# Patient Record
Sex: FEMALE | Race: WHITE | NOT HISPANIC OR LATINO
[De-identification: names, ages, dates, MRNs, and addresses within clinical notes are randomized per-mention and may not be internally consistent; named-entity substitution may affect disease eponyms.]

---

## 2017-11-20 PROBLEM — Z00.00 ENCOUNTER FOR PREVENTIVE HEALTH EXAMINATION: Status: ACTIVE | Noted: 2017-11-20

## 2018-01-29 ENCOUNTER — APPOINTMENT (OUTPATIENT)
Dept: CARDIOLOGY | Facility: CLINIC | Age: 58
End: 2018-01-29
Payer: COMMERCIAL

## 2018-01-29 PROCEDURE — 93015 CV STRESS TEST SUPVJ I&R: CPT

## 2018-01-29 PROCEDURE — 93306 TTE W/DOPPLER COMPLETE: CPT

## 2018-02-19 ENCOUNTER — RX RENEWAL (OUTPATIENT)
Age: 58
End: 2018-02-19

## 2018-04-19 ENCOUNTER — RX RENEWAL (OUTPATIENT)
Age: 58
End: 2018-04-19

## 2018-04-23 ENCOUNTER — RX RENEWAL (OUTPATIENT)
Age: 58
End: 2018-04-23

## 2019-03-13 ENCOUNTER — RX RENEWAL (OUTPATIENT)
Age: 59
End: 2019-03-13

## 2019-03-14 ENCOUNTER — MEDICATION RENEWAL (OUTPATIENT)
Age: 59
End: 2019-03-14

## 2019-04-17 ENCOUNTER — RX RENEWAL (OUTPATIENT)
Age: 59
End: 2019-04-17

## 2019-04-19 ENCOUNTER — APPOINTMENT (OUTPATIENT)
Dept: CARDIOLOGY | Facility: CLINIC | Age: 59
End: 2019-04-19

## 2019-05-24 DIAGNOSIS — Z87.09 PERSONAL HISTORY OF OTHER DISEASES OF THE RESPIRATORY SYSTEM: ICD-10-CM

## 2019-05-24 RX ORDER — UBIDECARENONE 50 MG
CAPSULE ORAL
Refills: 0 | Status: ACTIVE | COMMUNITY

## 2019-05-28 ENCOUNTER — NON-APPOINTMENT (OUTPATIENT)
Age: 59
End: 2019-05-28

## 2019-05-28 ENCOUNTER — APPOINTMENT (OUTPATIENT)
Dept: CARDIOLOGY | Facility: CLINIC | Age: 59
End: 2019-05-28
Payer: COMMERCIAL

## 2019-05-28 VITALS
HEIGHT: 63 IN | HEART RATE: 75 BPM | RESPIRATION RATE: 16 BRPM | SYSTOLIC BLOOD PRESSURE: 142 MMHG | BODY MASS INDEX: 21.09 KG/M2 | WEIGHT: 119 LBS | DIASTOLIC BLOOD PRESSURE: 76 MMHG

## 2019-05-28 PROCEDURE — 99213 OFFICE O/P EST LOW 20 MIN: CPT

## 2019-05-28 PROCEDURE — 93000 ELECTROCARDIOGRAM COMPLETE: CPT

## 2019-05-28 NOTE — ASSESSMENT
[FreeTextEntry1] : EKG 5/28/2019:  The EKG illustrates normal sinus rhythm, rate of 75, no significant ST-T wave changes.\par \par Most recent blood work (4/16/2019):  HDL (67), Triglycerides (153), Glucose (102).\par \par Most recent Transthoracic Echocardiogram (1/29/2018):  Normal global left ventricular function with an EF of 65%, mild to moderate TR and MR, trace AR, thinning hypermobile intra-atrial septum.\par \par Most recent Exercise Stress Test (1/29/2018):  Patient exercised 10 METS, patient developed 3 beat SVT during exercise, EKG was negative for ischemia, Lazcano Treadmill score was 9 consistent with low risk.

## 2019-05-28 NOTE — REASON FOR VISIT
[FreeTextEntry1] : The patient presents back to the office today for cardiac reevaluation with known history of palpitations in the chest, hypertension, and anxiety.  She reports feeling overall well and without cardiac complaints.  The patient denies CP, SOB, RODGERS, PND, orthopnea, presyncope, syncope.

## 2019-05-28 NOTE — HISTORY OF PRESENT ILLNESS
[FreeTextEntry1] : She continues to report intermittent palpitations of which are consistent with her baseline, there are no associated symptoms.\par \par Patient is tolerating cardiac medications without negative side effects including Metoprolol Succinate 50 mg QD and Omega 3 fish oil.

## 2019-05-28 NOTE — DISCUSSION/SUMMARY
[FreeTextEntry1] : 1).  Patient is tolerating cardiac medications without negative side effects, continue with current cardiac medication regimen (refer above). \par \par 2).  Diet and lifestyle modification discussed including low fat and low carbohydrate weight reducing diet, implement aerobic exercise 4 to 5 days per week.  \par \par 3).  Follow up with PCP regarding routine checkups and blood work, have copy faxed to our office. \par \par 4).  No additional cardiac testing indicated at this time. \par \par 5).  Recommend patient report any untoward symptoms. \par \par 6).  Follow up with our office in 6 months or PRN.

## 2019-05-28 NOTE — PHYSICAL EXAM
[Normal Appearance] : normal appearance [General Appearance - Well Developed] : well developed [General Appearance - Well Nourished] : well nourished [General Appearance - In No Acute Distress] : no acute distress [Normal Conjunctiva] : the conjunctiva exhibited no abnormalities [Normal Oral Mucosa] : normal oral mucosa [Normal Jugular Venous A Waves Present] : normal jugular venous A waves present [Normal Jugular Venous V Waves Present] : normal jugular venous V waves present [No Jugular Venous Carranza A Waves] : no jugular venous carranza A waves [] : no respiratory distress [Respiration, Rhythm And Depth] : normal respiratory rhythm and effort [Auscultation Breath Sounds / Voice Sounds] : lungs were clear to auscultation bilaterally [Heart Rate And Rhythm] : heart rate and rhythm were normal [Heart Sounds] : normal S1 and S2 [Murmurs] : no murmurs present [Edema] : no peripheral edema present [Bowel Sounds] : normal bowel sounds [Abnormal Walk] : normal gait [Nail Clubbing] : no clubbing of the fingernails [Cyanosis, Localized] : no localized cyanosis [Skin Color & Pigmentation] : normal skin color and pigmentation [Skin Turgor] : normal skin turgor [Oriented To Time, Place, And Person] : oriented to person, place, and time [Impaired Insight] : insight and judgment were intact [Affect] : the affect was normal

## 2019-11-07 ENCOUNTER — NON-APPOINTMENT (OUTPATIENT)
Age: 59
End: 2019-11-07

## 2019-11-07 ENCOUNTER — APPOINTMENT (OUTPATIENT)
Dept: CARDIOLOGY | Facility: CLINIC | Age: 59
End: 2019-11-07
Payer: COMMERCIAL

## 2019-11-07 VITALS
BODY MASS INDEX: 20.55 KG/M2 | RESPIRATION RATE: 16 BRPM | HEIGHT: 63 IN | SYSTOLIC BLOOD PRESSURE: 133 MMHG | DIASTOLIC BLOOD PRESSURE: 82 MMHG | HEART RATE: 86 BPM | WEIGHT: 116 LBS

## 2019-11-07 PROCEDURE — 99214 OFFICE O/P EST MOD 30 MIN: CPT

## 2019-11-07 PROCEDURE — 93000 ELECTROCARDIOGRAM COMPLETE: CPT

## 2019-11-07 NOTE — REASON FOR VISIT
[Follow-Up - Clinic] : a clinic follow-up of [FreeTextEntry1] : The patient is a 59-year-old white female with a known history of mild to moderate valvular insufficiency and heart murmur with occasional palpitations of the chest who presents back to the office for general cardiac checkup today;\par \par Overall, patient states she's been doing well without any significant cardiac symptoms\par \par ;

## 2019-11-07 NOTE — ASSESSMENT
[FreeTextEntry1] : EKG demonstrates normal sinus rhythm with a moderate rate of 86 and appears within normal limits\par \par In summary the patient is a 5-year-old woman who has had a history of palpitations and mild valvular insufficiency in the past with otherwise stable cardiac pattern at this time\par \par Plan:\par \par Recommend patient have transthoracic echocardiogram in the near future to assess valvulopathy and cardiac function\par \par Agree with continuing current antihypertensive regimen and regular exercise program\par \par Followup within 6 months to one year or p.r.n.;;;;;

## 2019-11-07 NOTE — PHYSICAL EXAM
[Normal Conjunctiva] : the conjunctiva exhibited no abnormalities [Normal Oral Mucosa] : normal oral mucosa [Eyelids - No Xanthelasma] : the eyelids demonstrated no xanthelasmas [No Oral Cyanosis] : no oral cyanosis [No Oral Pallor] : no oral pallor [Normal Jugular Venous A Waves Present] : normal jugular venous A waves present [No Jugular Venous Carranza A Waves] : no jugular venous carranza A waves [Normal Jugular Venous V Waves Present] : normal jugular venous V waves present [Auscultation Breath Sounds / Voice Sounds] : lungs were clear to auscultation bilaterally [Heart Rate And Rhythm] : heart rate and rhythm were normal [Exaggerated Use Of Accessory Muscles For Inspiration] : no accessory muscle use [Respiration, Rhythm And Depth] : normal respiratory rhythm and effort [Heart Sounds] : normal S1 and S2 [FreeTextEntry1] : Regular rhythm grade 1/6 systolic murmur best heard at the left sternal border [Abdomen Soft] : soft [Abdomen Mass (___ Cm)] : no abdominal mass palpated [Abdomen Tenderness] : non-tender [Gait - Sufficient For Exercise Testing] : the gait was sufficient for exercise testing [Abnormal Walk] : normal gait [Nail Clubbing] : no clubbing of the fingernails [Petechial Hemorrhages (___cm)] : no petechial hemorrhages [Skin Color & Pigmentation] : normal skin color and pigmentation [Cyanosis, Localized] : no localized cyanosis [No Venous Stasis] : no venous stasis [] : no rash [Skin Lesions] : no skin lesions [Oriented To Time, Place, And Person] : oriented to person, place, and time [No Skin Ulcers] : no skin ulcer [No Xanthoma] : no  xanthoma was observed [No Anxiety] : not feeling anxious [Affect] : the affect was normal [Mood] : the mood was normal

## 2019-11-07 NOTE — HISTORY OF PRESENT ILLNESS
[FreeTextEntry1] : She continues to exercise regularly and feels well with this however recently she has had some increased stress with a promotion at her job at [employer] for which he seems to be doing well;\par \par ;

## 2019-12-12 ENCOUNTER — APPOINTMENT (OUTPATIENT)
Dept: CARDIOLOGY | Facility: CLINIC | Age: 59
End: 2019-12-12
Payer: COMMERCIAL

## 2019-12-12 PROCEDURE — 93306 TTE W/DOPPLER COMPLETE: CPT

## 2020-11-09 ENCOUNTER — RX RENEWAL (OUTPATIENT)
Age: 60
End: 2020-11-09

## 2021-03-30 ENCOUNTER — NON-APPOINTMENT (OUTPATIENT)
Age: 61
End: 2021-03-30

## 2021-03-30 ENCOUNTER — APPOINTMENT (OUTPATIENT)
Dept: CARDIOLOGY | Facility: CLINIC | Age: 61
End: 2021-03-30
Payer: COMMERCIAL

## 2021-03-30 VITALS
TEMPERATURE: 97.6 F | WEIGHT: 124 LBS | HEART RATE: 77 BPM | SYSTOLIC BLOOD PRESSURE: 146 MMHG | RESPIRATION RATE: 16 BRPM | DIASTOLIC BLOOD PRESSURE: 80 MMHG | HEIGHT: 61 IN | BODY MASS INDEX: 23.41 KG/M2 | OXYGEN SATURATION: 98 %

## 2021-03-30 PROCEDURE — 99072 ADDL SUPL MATRL&STAF TM PHE: CPT

## 2021-03-30 PROCEDURE — 93000 ELECTROCARDIOGRAM COMPLETE: CPT

## 2021-03-30 PROCEDURE — 99214 OFFICE O/P EST MOD 30 MIN: CPT

## 2021-03-30 NOTE — PHYSICAL EXAM
[Normal Conjunctiva] : the conjunctiva exhibited no abnormalities [Eyelids - No Xanthelasma] : the eyelids demonstrated no xanthelasmas [Normal Oral Mucosa] : normal oral mucosa [No Oral Pallor] : no oral pallor [No Oral Cyanosis] : no oral cyanosis [Normal Jugular Venous A Waves Present] : normal jugular venous A waves present [Normal Jugular Venous V Waves Present] : normal jugular venous V waves present [No Jugular Venous Carranza A Waves] : no jugular venous carranza A waves [Respiration, Rhythm And Depth] : normal respiratory rhythm and effort [Auscultation Breath Sounds / Voice Sounds] : lungs were clear to auscultation bilaterally [Exaggerated Use Of Accessory Muscles For Inspiration] : no accessory muscle use [Heart Rate And Rhythm] : heart rate and rhythm were normal [Heart Sounds] : normal S1 and S2 [FreeTextEntry1] : Regular rhythm grade 1/6 systolic murmur best heard at the left sternal border [Abdomen Soft] : soft [Abdomen Tenderness] : non-tender [Abdomen Mass (___ Cm)] : no abdominal mass palpated [Abnormal Walk] : normal gait [Gait - Sufficient For Exercise Testing] : the gait was sufficient for exercise testing [Nail Clubbing] : no clubbing of the fingernails [Cyanosis, Localized] : no localized cyanosis [Petechial Hemorrhages (___cm)] : no petechial hemorrhages [Skin Color & Pigmentation] : normal skin color and pigmentation [] : no rash [No Venous Stasis] : no venous stasis [Skin Lesions] : no skin lesions [No Skin Ulcers] : no skin ulcer [No Xanthoma] : no  xanthoma was observed [Affect] : the affect was normal [Oriented To Time, Place, And Person] : oriented to person, place, and time [Mood] : the mood was normal [No Anxiety] : not feeling anxious

## 2021-03-30 NOTE — HISTORY OF PRESENT ILLNESS
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

## 2021-03-30 NOTE — REASON FOR VISIT
[Follow-Up - Clinic] : a clinic follow-up of [FreeTextEntry1] : The patient is a 61-year-old white female with history for mild valvular insufficiency and murmur with occasional to rare palpitations and chest;\par \par Patient follows up to the office today for general cardiac check;\par \par She reports that she has generally been feeling well and has had no significant chest pain, shortness of breath, palpitations dizziness or syncope;\par \par \par ;

## 2021-03-30 NOTE — ASSESSMENT
[FreeTextEntry1] : EKG shows normal sinus rhythm at a rate of 77. Essentially within normal limits\par \par In summary this 61-year-old woman has a history for occasional palpitations of the chest and mild valvular insufficiency with positive family history for heart disease;\par She's been doing well from a cardiac standpoint and exercising regularly;\par \par Plan:\par \par Have discussed recommendation for repeat cardiac stress test and echocardiogram prior to the next visit for which patient would like to put off for the end of the year or after the holidays;\par \par Okay to continue metoprolol succinate 4 her history of palpitation;\par \par Return to office within one year or p.r.n.\par \par Recommend fasting laboratory blood tests in the near future;\par (Rx given);;

## 2022-07-01 ENCOUNTER — RX RENEWAL (OUTPATIENT)
Age: 62
End: 2022-07-01

## 2022-07-19 ENCOUNTER — APPOINTMENT (OUTPATIENT)
Dept: CARDIOLOGY | Facility: CLINIC | Age: 62
End: 2022-07-19

## 2022-07-19 ENCOUNTER — NON-APPOINTMENT (OUTPATIENT)
Age: 62
End: 2022-07-19

## 2022-07-19 VITALS
WEIGHT: 122 LBS | HEIGHT: 61 IN | BODY MASS INDEX: 23.03 KG/M2 | RESPIRATION RATE: 16 BRPM | SYSTOLIC BLOOD PRESSURE: 152 MMHG | HEART RATE: 73 BPM | DIASTOLIC BLOOD PRESSURE: 100 MMHG

## 2022-07-19 VITALS — DIASTOLIC BLOOD PRESSURE: 90 MMHG | SYSTOLIC BLOOD PRESSURE: 156 MMHG

## 2022-07-19 DIAGNOSIS — R06.02 SHORTNESS OF BREATH: ICD-10-CM

## 2022-07-19 PROCEDURE — 93000 ELECTROCARDIOGRAM COMPLETE: CPT

## 2022-07-19 PROCEDURE — 99214 OFFICE O/P EST MOD 30 MIN: CPT | Mod: 25

## 2022-07-19 NOTE — HISTORY OF PRESENT ILLNESS
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

## 2022-07-19 NOTE — REASON FOR VISIT
[FreeTextEntry3] : N/A [Follow-Up - Clinic] : a clinic follow-up of [FreeTextEntry1] : The patient is a 62-year-old white female with history for hypertension , mild valvular insufficiency and murmur with occasional to rare palpitations and chest;\par \par Patient follows up to the office today for general cardiac checkup;\par \par She has not been seen in over 1 year now;\par \par She reports that she has generally been feeling well and has had no significant chest pain, shortness of breath, palpitations dizziness or syncope;\par \par \par ;

## 2022-07-19 NOTE — ASSESSMENT
[FreeTextEntry1] : EKG shows normal sinus rhythm at a rate of 73. Essentially within normal limits\par \par In summary this 62-year-old woman has a history for occasional palpitations of the chest and mild valvular insufficiency with positive family history for heart disease;\par She's been doing well from a cardiac standpoint and exercising regularly, with only occasional exertional dyspnea reported; she has been under increased stress however and anxiety also overseeing and helping and living with her mother at her home;;\par \par Plan:\par \par Continue current medical regimen and may take extra metoprolol 50 mg p.o. today for elevated BP;\par \par Patient encouraged to schedule echocardiogram to assess valvulopathy and nuclear stress test sometime in the early fall to rule out any significant CAD;\par \par Return to office within one year or p.r.n.

## 2022-07-19 NOTE — PHYSICAL EXAM
[Well Developed] : well developed [Well Nourished] : well nourished [No Acute Distress] : no acute distress [Normal Venous Pressure] : normal venous pressure [No Carotid Bruit] : no carotid bruit [Normal S1, S2] : normal S1, S2 [No Rub] : no rub [No Gallop] : no gallop [Clear Lung Fields] : clear lung fields [Good Air Entry] : good air entry [No Respiratory Distress] : no respiratory distress  [Soft] : abdomen soft [Non Tender] : non-tender [No Masses/organomegaly] : no masses/organomegaly [Normal Bowel Sounds] : normal bowel sounds [Normal Gait] : normal gait [No Edema] : no edema [No Cyanosis] : no cyanosis [No Clubbing] : no clubbing [No Varicosities] : no varicosities [No Rash] : no rash [No Skin Lesions] : no skin lesions [Moves all extremities] : moves all extremities [No Focal Deficits] : no focal deficits [Normal Speech] : normal speech [Alert and Oriented] : alert and oriented [Normal memory] : normal memory [de-identified] : Regular rhythm, grade 1/6 systolic murmur; [Normal Conjunctiva] : the conjunctiva exhibited no abnormalities [Eyelids - No Xanthelasma] : the eyelids demonstrated no xanthelasmas [Normal Oral Mucosa] : normal oral mucosa [No Oral Pallor] : no oral pallor [No Oral Cyanosis] : no oral cyanosis [Normal Jugular Venous A Waves Present] : normal jugular venous A waves present [Normal Jugular Venous V Waves Present] : normal jugular venous V waves present [No Jugular Venous Carranza A Waves] : no jugular venous carranza A waves [Respiration, Rhythm And Depth] : normal respiratory rhythm and effort [Exaggerated Use Of Accessory Muscles For Inspiration] : no accessory muscle use [Auscultation Breath Sounds / Voice Sounds] : lungs were clear to auscultation bilaterally [Heart Rate And Rhythm] : heart rate and rhythm were normal [Heart Sounds] : normal S1 and S2 [FreeTextEntry1] : Regular rhythm grade 1/6 systolic murmur best heard at the left sternal border [Abdomen Soft] : soft [Abdomen Tenderness] : non-tender [Abdomen Mass (___ Cm)] : no abdominal mass palpated [Abnormal Walk] : normal gait [Gait - Sufficient For Exercise Testing] : the gait was sufficient for exercise testing [Nail Clubbing] : no clubbing of the fingernails [Cyanosis, Localized] : no localized cyanosis [Petechial Hemorrhages (___cm)] : no petechial hemorrhages [Skin Color & Pigmentation] : normal skin color and pigmentation [] : no rash [No Venous Stasis] : no venous stasis [Skin Lesions] : no skin lesions [No Skin Ulcers] : no skin ulcer [No Xanthoma] : no  xanthoma was observed [Oriented To Time, Place, And Person] : oriented to person, place, and time [Affect] : the affect was normal [Mood] : the mood was normal [No Anxiety] : not feeling anxious

## 2022-08-11 ENCOUNTER — APPOINTMENT (OUTPATIENT)
Dept: CARDIOLOGY | Facility: CLINIC | Age: 62
End: 2022-08-11

## 2022-08-11 PROCEDURE — 93306 TTE W/DOPPLER COMPLETE: CPT

## 2022-10-07 ENCOUNTER — APPOINTMENT (OUTPATIENT)
Dept: CARDIOLOGY | Facility: CLINIC | Age: 62
End: 2022-10-07

## 2022-10-07 PROCEDURE — 93015 CV STRESS TEST SUPVJ I&R: CPT

## 2022-10-07 PROCEDURE — 78452 HT MUSCLE IMAGE SPECT MULT: CPT

## 2022-10-07 PROCEDURE — A9500: CPT

## 2023-02-15 RX ORDER — KIT FOR THE PREPARATION OF TECHNETIUM TC99M SESTAMIBI 1 MG/5ML
INJECTION, POWDER, LYOPHILIZED, FOR SOLUTION PARENTERAL
Refills: 0 | Status: COMPLETED | OUTPATIENT
Start: 2023-02-15

## 2023-02-15 RX ADMIN — KIT FOR THE PREPARATION OF TECHNETIUM TC99M SESTAMIBI 0: 1 INJECTION, POWDER, LYOPHILIZED, FOR SOLUTION PARENTERAL at 00:00

## 2024-01-25 ENCOUNTER — NON-APPOINTMENT (OUTPATIENT)
Age: 64
End: 2024-01-25

## 2024-01-25 ENCOUNTER — APPOINTMENT (OUTPATIENT)
Dept: CARDIOLOGY | Facility: CLINIC | Age: 64
End: 2024-01-25
Payer: COMMERCIAL

## 2024-01-25 VITALS
RESPIRATION RATE: 16 BRPM | WEIGHT: 125 LBS | HEART RATE: 77 BPM | DIASTOLIC BLOOD PRESSURE: 81 MMHG | BODY MASS INDEX: 23.6 KG/M2 | HEIGHT: 61 IN | SYSTOLIC BLOOD PRESSURE: 156 MMHG

## 2024-01-25 DIAGNOSIS — I38 ENDOCARDITIS, VALVE UNSPECIFIED: ICD-10-CM

## 2024-01-25 DIAGNOSIS — I10 ESSENTIAL (PRIMARY) HYPERTENSION: ICD-10-CM

## 2024-01-25 DIAGNOSIS — R00.2 PALPITATIONS: ICD-10-CM

## 2024-01-25 PROCEDURE — 93000 ELECTROCARDIOGRAM COMPLETE: CPT

## 2024-01-25 PROCEDURE — 99214 OFFICE O/P EST MOD 30 MIN: CPT | Mod: 25

## 2024-01-25 NOTE — HISTORY OF PRESENT ILLNESS
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

## 2024-01-25 NOTE — REASON FOR VISIT
[FreeTextEntry3] : N/A [FreeTextEntry1] : The patient is a 64-year-old white female with history for hypertension , mild valvular insufficiency and murmur with occasional to rare palpitations and chest;  Patient follows up to the office today for general cardiac checkup;  It seems that she has noticed an increase in intermittent palpitations and fluttering in the chest recently which seems to coincide with of course increased stress in her life.  Much of this recent stress was because her mother was hospitalized with heart block and required emergency pacemaker.  Subsequent to this after being discharged and December, she was readmitted for shortness of breath and possible mild fluid overload-CHF and possible pneumonitis; (She was successfully treated with diuretics and antibiotics and transferred to [facility] where the patient (Jennifer) is employed as an  there;)  She denies any associated lightheadedness dizziness or syncope.  There is been no chest pain or dyspnea.  She does a moderate amount of physical activity and exercise and seems to do okay with that.  [Follow-Up - Clinic] : a clinic follow-up of

## 2024-01-25 NOTE — ASSESSMENT
[FreeTextEntry1] : EKG shows normal sinus rhythm at a rate of 77.  It otherwise appears within normal limits;  In summary, this 64-year-old woman has a history for mitral valve prolapse syndrome and some intermittent palpitations sometimes exacerbated by increased anxiety or stress which she certainly has been having lately.  She has been also treated for mild hypertension; Her last nuclear stress test in 2022 was negative for ischemia on the myocardial perfusion images;  Plan:  Patient recommended to continue metoprolol and will renew when she forwards new pharmacy health plan;  Recommend wearing cardiac event monitor MCOT for 7 days to rule out any significant dysrhythmia;  Reinforced low-sodium diet and home blood pressure checks in the near future;  Follow-up visit within 6 months or as needed;

## 2024-01-25 NOTE — PHYSICAL EXAM
[Well Developed] : well developed [Well Nourished] : well nourished [No Acute Distress] : no acute distress [Normal Venous Pressure] : normal venous pressure [No Carotid Bruit] : no carotid bruit [Normal S1, S2] : normal S1, S2 [No Rub] : no rub [No Gallop] : no gallop [Clear Lung Fields] : clear lung fields [Good Air Entry] : good air entry [No Respiratory Distress] : no respiratory distress  [Soft] : abdomen soft [Non Tender] : non-tender [No Masses/organomegaly] : no masses/organomegaly [Normal Bowel Sounds] : normal bowel sounds [Normal Gait] : normal gait [No Edema] : no edema [No Cyanosis] : no cyanosis [No Clubbing] : no clubbing [No Varicosities] : no varicosities [No Rash] : no rash [No Skin Lesions] : no skin lesions [Moves all extremities] : moves all extremities [No Focal Deficits] : no focal deficits [Normal Speech] : normal speech [Alert and Oriented] : alert and oriented [Normal memory] : normal memory [de-identified] : Regular rhythm, grade 1/6 systolic murmur; [Normal Conjunctiva] : the conjunctiva exhibited no abnormalities [Eyelids - No Xanthelasma] : the eyelids demonstrated no xanthelasmas [Normal Oral Mucosa] : normal oral mucosa [No Oral Pallor] : no oral pallor [No Oral Cyanosis] : no oral cyanosis [Normal Jugular Venous A Waves Present] : normal jugular venous A waves present [Normal Jugular Venous V Waves Present] : normal jugular venous V waves present [No Jugular Venous Carranza A Waves] : no jugular venous carranza A waves [Respiration, Rhythm And Depth] : normal respiratory rhythm and effort [Exaggerated Use Of Accessory Muscles For Inspiration] : no accessory muscle use [Auscultation Breath Sounds / Voice Sounds] : lungs were clear to auscultation bilaterally [Heart Rate And Rhythm] : heart rate and rhythm were normal [Heart Sounds] : normal S1 and S2 [FreeTextEntry1] : Regular rhythm grade 1/6 systolic murmur best heard at the left sternal border [Abdomen Soft] : soft [Abdomen Tenderness] : non-tender [Abdomen Mass (___ Cm)] : no abdominal mass palpated [Abnormal Walk] : normal gait [Gait - Sufficient For Exercise Testing] : the gait was sufficient for exercise testing [Nail Clubbing] : no clubbing of the fingernails [Cyanosis, Localized] : no localized cyanosis [Petechial Hemorrhages (___cm)] : no petechial hemorrhages [Skin Color & Pigmentation] : normal skin color and pigmentation [] : no rash [No Venous Stasis] : no venous stasis [Skin Lesions] : no skin lesions [No Skin Ulcers] : no skin ulcer [No Xanthoma] : no  xanthoma was observed [Oriented To Time, Place, And Person] : oriented to person, place, and time [Affect] : the affect was normal [Mood] : the mood was normal [No Anxiety] : not feeling anxious

## 2024-02-29 RX ORDER — METOPROLOL SUCCINATE 50 MG/1
50 TABLET, EXTENDED RELEASE ORAL
Qty: 90 | Refills: 3 | Status: ACTIVE | COMMUNITY
Start: 2018-02-19 | End: 1900-01-01

## 2024-07-11 ENCOUNTER — NON-APPOINTMENT (OUTPATIENT)
Age: 64
End: 2024-07-11

## 2024-07-11 ENCOUNTER — APPOINTMENT (OUTPATIENT)
Dept: CARDIOLOGY | Facility: CLINIC | Age: 64
End: 2024-07-11
Payer: COMMERCIAL

## 2024-07-11 VITALS
WEIGHT: 119 LBS | RESPIRATION RATE: 16 BRPM | SYSTOLIC BLOOD PRESSURE: 142 MMHG | BODY MASS INDEX: 22.47 KG/M2 | DIASTOLIC BLOOD PRESSURE: 84 MMHG | HEART RATE: 74 BPM | HEIGHT: 61 IN

## 2024-07-11 DIAGNOSIS — R00.2 PALPITATIONS: ICD-10-CM

## 2024-07-11 DIAGNOSIS — I10 ESSENTIAL (PRIMARY) HYPERTENSION: ICD-10-CM

## 2024-07-11 DIAGNOSIS — R06.02 SHORTNESS OF BREATH: ICD-10-CM

## 2024-07-11 DIAGNOSIS — I38 ENDOCARDITIS, VALVE UNSPECIFIED: ICD-10-CM

## 2024-07-11 PROCEDURE — 93000 ELECTROCARDIOGRAM COMPLETE: CPT

## 2024-07-11 PROCEDURE — 99214 OFFICE O/P EST MOD 30 MIN: CPT

## 2024-07-11 PROCEDURE — G2211 COMPLEX E/M VISIT ADD ON: CPT | Mod: NC

## 2024-07-30 ENCOUNTER — RX ONLY (RX ONLY)
Age: 64
End: 2024-07-30

## 2024-07-30 ENCOUNTER — OFFICE (OUTPATIENT)
Dept: URBAN - METROPOLITAN AREA CLINIC 12 | Facility: CLINIC | Age: 64
Setting detail: OPHTHALMOLOGY
End: 2024-07-30
Payer: COMMERCIAL

## 2024-07-30 DIAGNOSIS — B00.52: ICD-10-CM

## 2024-07-30 PROCEDURE — 92002 INTRM OPH EXAM NEW PATIENT: CPT | Performed by: OPHTHALMOLOGY

## 2024-07-30 ASSESSMENT — CONFRONTATIONAL VISUAL FIELD TEST (CVF)
OS_FINDINGS: FULL
OD_FINDINGS: FULL

## 2024-08-01 ENCOUNTER — OFFICE (OUTPATIENT)
Dept: URBAN - METROPOLITAN AREA CLINIC 12 | Facility: CLINIC | Age: 64
Setting detail: OPHTHALMOLOGY
End: 2024-08-01
Payer: COMMERCIAL

## 2024-08-01 DIAGNOSIS — B00.52: ICD-10-CM

## 2024-08-01 DIAGNOSIS — H20.00: ICD-10-CM

## 2024-08-01 PROCEDURE — 92012 INTRM OPH EXAM EST PATIENT: CPT | Performed by: OPHTHALMOLOGY

## 2024-08-01 ASSESSMENT — CONFRONTATIONAL VISUAL FIELD TEST (CVF)
OS_FINDINGS: FULL
OD_FINDINGS: FULL

## 2024-08-06 ENCOUNTER — OFFICE (OUTPATIENT)
Dept: URBAN - METROPOLITAN AREA CLINIC 12 | Facility: CLINIC | Age: 64
Setting detail: OPHTHALMOLOGY
End: 2024-08-06
Payer: COMMERCIAL

## 2024-08-06 DIAGNOSIS — H16.223: ICD-10-CM

## 2024-08-06 DIAGNOSIS — B00.52: ICD-10-CM

## 2024-08-06 DIAGNOSIS — H20.00: ICD-10-CM

## 2024-08-06 PROCEDURE — 92012 INTRM OPH EXAM EST PATIENT: CPT | Performed by: OPHTHALMOLOGY

## 2024-08-06 ASSESSMENT — CONFRONTATIONAL VISUAL FIELD TEST (CVF)
OS_FINDINGS: FULL
OD_FINDINGS: FULL

## 2024-08-22 ENCOUNTER — RX ONLY (RX ONLY)
Age: 64
End: 2024-08-22

## 2024-08-22 ENCOUNTER — OFFICE (OUTPATIENT)
Dept: URBAN - METROPOLITAN AREA CLINIC 12 | Facility: CLINIC | Age: 64
Setting detail: OPHTHALMOLOGY
End: 2024-08-22
Payer: COMMERCIAL

## 2024-08-22 DIAGNOSIS — H25.13: ICD-10-CM

## 2024-08-22 DIAGNOSIS — H43.393: ICD-10-CM

## 2024-08-22 DIAGNOSIS — H20.00: ICD-10-CM

## 2024-08-22 DIAGNOSIS — H16.223: ICD-10-CM

## 2024-08-22 DIAGNOSIS — B00.52: ICD-10-CM

## 2024-08-22 PROBLEM — H52.7 REFRACTIVE ERROR: Status: ACTIVE | Noted: 2024-08-22

## 2024-08-22 PROCEDURE — 92014 COMPRE OPH EXAM EST PT 1/>: CPT | Performed by: OPHTHALMOLOGY

## 2024-08-22 ASSESSMENT — CONFRONTATIONAL VISUAL FIELD TEST (CVF)
OD_FINDINGS: FULL
OS_FINDINGS: FULL

## 2024-10-03 ENCOUNTER — OFFICE (OUTPATIENT)
Dept: URBAN - METROPOLITAN AREA CLINIC 12 | Facility: CLINIC | Age: 64
Setting detail: OPHTHALMOLOGY
End: 2024-10-03
Payer: COMMERCIAL

## 2024-10-03 DIAGNOSIS — H43.823: ICD-10-CM

## 2024-10-03 DIAGNOSIS — B00.52: ICD-10-CM

## 2024-10-03 DIAGNOSIS — H16.223: ICD-10-CM

## 2024-10-03 DIAGNOSIS — H25.13: ICD-10-CM

## 2024-10-03 DIAGNOSIS — H43.393: ICD-10-CM

## 2024-10-03 DIAGNOSIS — H20.00: ICD-10-CM

## 2024-10-03 PROBLEM — H02.403 PTOSIS OF EYELID, UNSPECIFIED; BOTH EYES: Status: ACTIVE | Noted: 2024-10-03

## 2024-10-03 PROCEDURE — 92012 INTRM OPH EXAM EST PATIENT: CPT | Performed by: OPHTHALMOLOGY

## 2024-10-03 PROCEDURE — 92134 CPTRZ OPH DX IMG PST SGM RTA: CPT | Performed by: OPHTHALMOLOGY

## 2024-10-03 ASSESSMENT — KERATOMETRY
OD_K1POWER_DIOPTERS: 44.25
OD_K2POWER_DIOPTERS: 44.50
OS_K2POWER_DIOPTERS: 44.00
OS_K1POWER_DIOPTERS: 43.50
OD_AXISANGLE_DEGREES: 109
METHOD_AUTO_MANUAL: AUTO
OS_AXISANGLE_DEGREES: 115

## 2024-10-03 ASSESSMENT — REFRACTION_AUTOREFRACTION
OS_CYLINDER: -0.50
OS_SPHERE: +1.00
OD_AXIS: 041
OS_AXIS: 022
OD_CYLINDER: -0.75
OD_SPHERE: -0.25

## 2024-10-03 ASSESSMENT — CONFRONTATIONAL VISUAL FIELD TEST (CVF)
OS_FINDINGS: FULL
OD_FINDINGS: FULL

## 2024-10-03 ASSESSMENT — LID POSITION - PTOSIS
OS_PTOSIS: LUL
OD_PTOSIS: RUL

## 2024-10-03 ASSESSMENT — VISUAL ACUITY
OS_BCVA: 20/30-1
OD_BCVA: 20/25-1

## 2024-10-03 ASSESSMENT — TONOMETRY
OD_IOP_MMHG: 15
OS_IOP_MMHG: 12

## 2025-06-23 ENCOUNTER — APPOINTMENT (OUTPATIENT)
Dept: CARDIOLOGY | Facility: CLINIC | Age: 65
End: 2025-06-23
Payer: COMMERCIAL

## 2025-06-23 PROCEDURE — 93306 TTE W/DOPPLER COMPLETE: CPT

## 2025-07-07 ENCOUNTER — APPOINTMENT (OUTPATIENT)
Dept: CARDIOLOGY | Facility: CLINIC | Age: 65
End: 2025-07-07

## 2025-08-11 ENCOUNTER — APPOINTMENT (OUTPATIENT)
Dept: CARDIOLOGY | Facility: CLINIC | Age: 65
End: 2025-08-11
Payer: COMMERCIAL

## 2025-08-11 VITALS
DIASTOLIC BLOOD PRESSURE: 90 MMHG | BODY MASS INDEX: 23.22 KG/M2 | HEART RATE: 70 BPM | WEIGHT: 123 LBS | SYSTOLIC BLOOD PRESSURE: 146 MMHG | HEIGHT: 61 IN

## 2025-08-11 DIAGNOSIS — R00.2 PALPITATIONS: ICD-10-CM

## 2025-08-11 DIAGNOSIS — I38 ENDOCARDITIS, VALVE UNSPECIFIED: ICD-10-CM

## 2025-08-11 DIAGNOSIS — I10 ESSENTIAL (PRIMARY) HYPERTENSION: ICD-10-CM

## 2025-08-11 PROCEDURE — ZZZZZ: CPT
